# Patient Record
(demographics unavailable — no encounter records)

---

## 2024-10-14 NOTE — REASON FOR VISIT
[Follow-Up Visit] : a follow-up visit for [Ingrown Nail] : ingrown nail [FreeTextEntry2] : Patient is being seen today for L ingrown nail.

## 2024-10-27 NOTE — REVIEW OF SYSTEMS
[Fever] : no fever [Chills] : no chills [Eye Pain] : no eye pain [Loss Of Hearing] : no hearing loss [Chest Pain] : no chest pain [Shortness Of Breath] : no shortness of breath [Wheezing] : no wheezing [Abdominal Pain] : no abdominal pain [Anxiety] : no anxiety [Easy Bleeding] : no tendency for easy bleeding [Negative] : Neurological [FreeTextEntry5] : ANANDAL, MIKHAIL [FreeTextEntry9] : Pain and swelling to the right  [de-identified] :  hx of Chronic paronychia of the right great toe , currently no infection , no s/p total nail avulsion - healed  [de-identified] : Pre diabetic , with strong family predisposition

## 2024-10-27 NOTE — HISTORY OF PRESENT ILLNESS
[FreeTextEntry1] : Patient presenting for f/u for painful ingrown toenail. Patient relates no pain to the area after the recent office visit. Patient states has been standing and walking at work with no complains. Patient is a diet controlled diabetic and does not recollect recent BG or HgA1c.

## 2024-10-27 NOTE — ASSESSMENT
[FreeTextEntry1] : .Patient seen and evaluated. Discussed etiology and treatment plan. Patient verbalized understanding. Discussed to c/w wide shoes and to perform daily foot checks and monitor for any new lesions, open wounds or calluses and to return to clinic at the earliest. Advised patient to return to clinic if pain recurs, and may possibly need partial nail avulsion. Advised patient to maintain tight glycemic control and to f/u with PCP or endocrinologist for routine monitoring. RTC as needed. Spent 20 minutes for patient care and medical decision making.

## 2024-10-27 NOTE — PHYSICAL EXAM
[General Appearance - Alert] : alert [General Appearance - In No Acute Distress] : in no acute distress [Ankle Swelling (On Exam)] : not present [Varicose Veins Of Lower Extremities] : not present [] : not present [2+] : left foot dorsalis pedis 2+ [de-identified] : 5/5 muscle strength for all muscles and tendons crossing the foot and ankle joints, ankle joint and STJ ROM intact b/l. No pain with calf compression b/l.  [FreeTextEntry1] : Light touch sensation intact to the level of the digits b/l. [Oriented To Time, Place, And Person] : oriented to person, place, and time [Affect] : the affect was normal

## 2024-10-27 NOTE — PHYSICAL EXAM
[General Appearance - Alert] : alert [General Appearance - In No Acute Distress] : in no acute distress [Ankle Swelling (On Exam)] : not present [Varicose Veins Of Lower Extremities] : not present [] : not present [2+] : left foot dorsalis pedis 2+ [de-identified] : 5/5 muscle strength for all muscles and tendons crossing the foot and ankle joints, ankle joint and STJ ROM intact b/l. No pain with calf compression b/l.  [FreeTextEntry1] : Light touch sensation intact to the level of the digits b/l. [Oriented To Time, Place, And Person] : oriented to person, place, and time [Affect] : the affect was normal

## 2024-10-27 NOTE — REVIEW OF SYSTEMS
[Fever] : no fever [Chills] : no chills [Eye Pain] : no eye pain [Loss Of Hearing] : no hearing loss [Chest Pain] : no chest pain [Shortness Of Breath] : no shortness of breath [Wheezing] : no wheezing [Abdominal Pain] : no abdominal pain [Anxiety] : no anxiety [Easy Bleeding] : no tendency for easy bleeding [Negative] : Neurological [FreeTextEntry5] : ANANDAL, MIKHAIL [FreeTextEntry9] : Pain and swelling to the right  [de-identified] :  hx of Chronic paronychia of the right great toe , currently no infection , no s/p total nail avulsion - healed  [de-identified] : Pre diabetic , with strong family predisposition

## 2024-11-25 NOTE — PLAN
[FreeTextEntry1] : 1. stress headaches * trial of sumatriptan 25 mg as needed * instructed to call back if headache worsen , dizziness, visual changes, n/v or facial drop, muscle paresis 2. prediabetes * lab for a1c * Dietary counseling given, dietary avoidance discussed, diet and exercise reviewed with patient; patient reminded of importance of aerobic exercise, weight control, dietary compliance and regular glucose monitoring 3. hypercholesterolemia * lab for fasting lipids * low cholesterol, low triglycerides diet, dietary counseling given; dietary avoidance discussed; diet and exercise reviewed with patient * c/w atorvastatin 20 mg 4. need for influenza vaccination * influenza vaccine administered * f/u in three months

## 2024-11-25 NOTE — PHYSICAL EXAM
[Normal] : normal rate, regular rhythm, normal S1 and S2 and no murmur heard
Bill For Individual Tests Below?: no
Detail Level: None
Number Of Tubes Drawn: 6
Venipuncture Paragraph: An alcohol pad was applied to the venipuncture site. Venipuncture was performed using a butterfly needle. Pressure and a bandaid was applied to the site. No complications were noted. Access to the Lac x1 attempt per Thea SAENZN RN successful. Pressure 2x2 and bandaid applied pt tolerated well

## 2025-03-20 NOTE — PLAN
[FreeTextEntry1] : 1. hypercholesterolemia * low cholesterol, low triglycerides diet, dietary counseling given; dietary avoidance discussed; diet and exercise reviewed with patient * c/w atorvastatin 20 mg * general fasting labs done 2. prediabetes * Dietary counseling given, dietary avoidance discussed, diet and exercise reviewed with patient; patient reminded of importance of aerobic exercise, weight control, dietary compliance and regular glucose monitoring 3. bilateral carpal tunnel * hand orthopedic surgeon referral * recommended to wear wrist brace and do exercises at home * f/u in three months

## 2025-03-20 NOTE — HISTORY OF PRESENT ILLNESS
[FreeTextEntry1] : follow up Interview and discussion conducted in Urdu by Urdu speaking physician [de-identified] : 57 years old female with hypercholesterolemia, prediabetes, presents for follow up, states feeling well, refers persistent chronic numbness hands , and wrist pain, she was referred to orthopedic last year , didn't go; gained weight

## 2025-06-18 NOTE — PLAN
[FreeTextEntry1] : 1. bilateral carpal tunnel syndrome * recommended hand orthopedic surgeon follow up * referral for physical therapy 2. hyperlipidemia * low cholesterol, low triglycerides diet, dietary counseling given; dietary avoidance discussed; diet and exercise reviewed with patient * dietary adherence compliance stressed 3. prediabetes * Dietary counseling given, dietary avoidance discussed, diet and exercise reviewed with patient; patient reminded of importance of aerobic exercise, weight control, dietary compliance and regular glucose monitoring 4. lesion right lower eyelid * referral to dermatology * f/u in three months

## 2025-06-18 NOTE — HISTORY OF PRESENT ILLNESS
[FreeTextEntry1] : follow up Interview and discussion conducted in Hebrew by Hebrew speaking physician  [de-identified] : 57 years old female presents for follow up to review and discuss labs test results; states still with pain and tingling on right hand from carpal tunnel, she was seen by hand orthopedic surgeon in April had cortisone injection both hands; she also complaints of chronic right lower eyelid nevi, verrucae lesion , would like to see dermatologist, think lesion is getting bigger